# Patient Record
(demographics unavailable — no encounter records)

---

## 2025-06-06 NOTE — HISTORY OF PRESENT ILLNESS
[8] : 8 [Burning] : burning [de-identified] : 6/6/25: 63yo female presenting for R ankle pain. Reports she fell down 2-3 steps at home today. Experiencing burning pain and tingling of R toes. No prior treatment.   PMH: denied Allergies: MERLINE  [FreeTextEntry1] : R ankle

## 2025-06-06 NOTE — ASSESSMENT
[FreeTextEntry1] : The patient was advised of the diagnosis. The natural history of the pathology was explained in full to the patient in layman's terms. All questions were answered. The risks and benefits of surgical and non-surgical treatment alternatives were explained in full to the patient.  Pt provided right Airsport brace for comfort. Will rto in 1 week for consultation with Dr. Wallace.  PRN otc motrin for discomfort. RICE tx discussed.

## 2025-06-06 NOTE — IMAGING
[de-identified] : right ankle exam: Pratt Test: negative Homans Sign: negative TTP: ATFL / CFL Anterior Drawer Test: negative Talar Tilt Test: negative Circumduction: painful Strength Testin/5 all planes Gait examination: minimally antalgic to the right   right foot: TTP: none Forefoot compression: nttp TTP over posterior tibialis / pain with inversion against resistance. EHL / FHL strength: 5/5 2+ DP and PT pulses are noted. All digits are nvi with FAROM.  Right ankle 3 view xray: inferior calcaneal spur / early hindfoot OA / no acute bony pathology.

## 2025-06-12 NOTE — IMAGING
[Right] : right ankle [There are no fractures, subluxations or dislocations. No significant abnormalities are seen] : There are no fractures, subluxations or dislocations. No significant abnormalities are seen [FreeTextEntry9] : AP, mortise and lateral xrays of the ankle were taken and reviewed from 6/6/25 showing TN dorsal spuring with plantar heel spur.

## 2025-06-12 NOTE — HISTORY OF PRESENT ILLNESS
[de-identified] : 06.12.25: Patient is a 62-year-old F complaining of R foot pain. Pt states she fell downstairs on 06/6/25. Seen by at [facility] urgent care XR concludes a sprain. Pt was given a tall cam boot. WB in tall boot and sneaker. Prior ankle sprains.

## 2025-06-12 NOTE — PHYSICAL EXAM
[Right] : right foot and ankle [NL (40)] : plantar flexion 40 degrees [NL 30)] : inversion 30 degrees [NL (20)] : eversion 20 degrees [5___] : eversion 5[unfilled]/5 [2+] : dorsalis pedis pulse: 2+ [] : no achilles tendon insertion tenderness